# Patient Record
Sex: FEMALE | Race: OTHER | NOT HISPANIC OR LATINO | URBAN - METROPOLITAN AREA
[De-identification: names, ages, dates, MRNs, and addresses within clinical notes are randomized per-mention and may not be internally consistent; named-entity substitution may affect disease eponyms.]

---

## 2023-12-18 ENCOUNTER — EMERGENCY (EMERGENCY)
Facility: HOSPITAL | Age: 28
LOS: 1 days | Discharge: HOME | End: 2023-12-18
Admitting: EMERGENCY MEDICINE
Payer: MEDICAID

## 2023-12-18 VITALS
OXYGEN SATURATION: 97 % | TEMPERATURE: 98 F | HEIGHT: 65 IN | SYSTOLIC BLOOD PRESSURE: 113 MMHG | DIASTOLIC BLOOD PRESSURE: 69 MMHG | WEIGHT: 110.23 LBS | HEART RATE: 74 BPM | RESPIRATION RATE: 18 BRPM

## 2023-12-18 VITALS
SYSTOLIC BLOOD PRESSURE: 111 MMHG | HEART RATE: 68 BPM | TEMPERATURE: 98 F | RESPIRATION RATE: 17 BRPM | OXYGEN SATURATION: 98 % | DIASTOLIC BLOOD PRESSURE: 50 MMHG

## 2023-12-18 PROCEDURE — 99284 EMERGENCY DEPT VISIT MOD MDM: CPT

## 2023-12-18 RX ORDER — IBUPROFEN 200 MG
600 TABLET ORAL ONCE
Refills: 0 | Status: COMPLETED | OUTPATIENT
Start: 2023-12-18 | End: 2023-12-18

## 2023-12-18 RX ORDER — VALACYCLOVIR 500 MG/1
1000 TABLET, FILM COATED ORAL ONCE
Refills: 0 | Status: COMPLETED | OUTPATIENT
Start: 2023-12-18 | End: 2023-12-18

## 2023-12-18 RX ORDER — VALACYCLOVIR 500 MG/1
1 TABLET, FILM COATED ORAL
Qty: 14 | Refills: 0
Start: 2023-12-18 | End: 2023-12-24

## 2023-12-18 RX ORDER — MUPIROCIN 20 MG/G
1 OINTMENT TOPICAL ONCE
Refills: 0 | Status: COMPLETED | OUTPATIENT
Start: 2023-12-18 | End: 2023-12-18

## 2023-12-18 RX ADMIN — Medication 600 MILLIGRAM(S): at 15:26

## 2023-12-18 RX ADMIN — MUPIROCIN 1 APPLICATION(S): 20 OINTMENT TOPICAL at 15:26

## 2023-12-18 RX ADMIN — VALACYCLOVIR 1000 MILLIGRAM(S): 500 TABLET, FILM COATED ORAL at 15:26

## 2023-12-18 NOTE — ED PROVIDER NOTE - CLINICAL SUMMARY MEDICAL DECISION MAKING FREE TEXT BOX
27 y/o F presents complaining of headache and lip sore x1 week. On exam patient has blister to left lower lip. Plan for Motrin and valtrex.

## 2023-12-18 NOTE — ED ADULT NURSE REASSESSMENT NOTE - NS ED NURSE REASSESS COMMENT FT1
Pt dc and all education given and gone over with pt. Pt has no further questions and self ambulating out of ed with all belongings and ppwrk

## 2023-12-18 NOTE — ED ADULT NURSE NOTE - OBJECTIVE STATEMENT
The patient is a 28y Female complaining of headache. Reports rash on bottom left lip. Pt medicated as ordered. All medication education given to patient and pt understands. Denies pain

## 2023-12-18 NOTE — ED PROVIDER NOTE - PATIENT PORTAL LINK FT
You can access the FollowMyHealth Patient Portal offered by [facility] by registering at the following website: [email or web address]. By joining [facility]’s FollowMyHealth portal, you will also be able to view your health information using other applications (apps) compatible with our system. You can access the FollowMyHealth Patient Portal offered by [facility] by registering at the following website: [email or web address]. By joining [facility]’s FollowMyHealth portal, you will also be able to view your health information using other applications (apps) compatible with our system.

## 2023-12-18 NOTE — ED PROVIDER NOTE - OBJECTIVE STATEMENT
27 y/o F with Hx of hypoglycemia not on medications, presents with headache, sore on left lip, and fatigue x1 week. Patient endorses dizziness and malaise. No N/V, no fever, no chills. She has had this on her lip once before but it was more itchy with no pus. No chest pain, not taking any medications. No allergies. No EtOH, no smoking.

## 2023-12-18 NOTE — ED PROVIDER NOTE - PHYSICAL EXAMINATION
VITAL SIGNS: I have reviewed nursing notes and confirm.  CONST: Well appearing; No apparent distress.  ENT: No nasal discharge; airway clear. +Small blister to left lower lip, small amount of yellow crusting.   EYES: Sclera clear. Pupils round and symmetrical bilaterally.  RESP: Breathing comfortably; speaking in full sentences.   MSK: No acute deformities noted to extremities.   NEURO: Alert, oriented. Speech is fluent and appropriate. Moving all extremities appropriately. No gross motor or sensory abnormalities.  SKIN: Skin is normal temperature; no diaphoresis; no pallor.  PSYCH: Cooperative. Appropriate mood, language, and behavior.

## 2023-12-18 NOTE — ED PROVIDER NOTE - NSFOLLOWUPINSTRUCTIONS_ED_ALL_ED_FT
ES POSIBLE QUE ESTA ES DELROY INFECCION CAUSADA POR DELROY BACTERIA OR UN VIRUS.     ESTOY ENVIANDO MEDICINA POR LOS DOS.     DRAIA LAS PASTILAS 2 VECES AL DIAZ POR 7 SOLORZANO.     USA LA CREMA ANTIBIOTICA 3 VECES AL DIAZ POR 5 SOLORZANO.     PUEDE DARIA IBUPROFINA O ACETAMINOFINA SCOTT NECESSARIO.     REGRESA PARA FIEBRE, ESCALAFRIOS, ERUPCION DEL PIEL, MAS DOLOR DE KERRI, DOLOR EN EL LYNETTE, O OTRAS SINTOMAS QUE PREOCUPASE. ES POSIBLE QUE ESTA ES DELROY INFECCION CAUSADA POR DELROY BACTERIA OR UN VIRUS.     ESTOY ENVIANDO MEDICINA POR LOS DOS.     DARIA LAS PASTILAS 2 VECES AL DIAZ POR 7 SOLORZANO.     USA LA CREMA ANTIBIOTICA 3 VECES AL DIAZ POR 5 SOLORZANO.     PUEDE DARIA IBUPROFINA O ACETAMINOFINA SCOTT NECESSARIO.     REGRESA PARA FIEBRE, ESCALAFRIOS, ERUPCION DEL PIEL, MAS DOLOR DE KERRI, DOLOR EN EL LYNETTE, O OTRAS SINTOMAS QUE PREOCUPASE.

## 2023-12-22 DIAGNOSIS — R53.81 OTHER MALAISE: ICD-10-CM

## 2023-12-22 DIAGNOSIS — Z86.39 PERSONAL HISTORY OF OTHER ENDOCRINE, NUTRITIONAL AND METABOLIC DISEASE: ICD-10-CM

## 2023-12-22 DIAGNOSIS — K13.0 DISEASES OF LIPS: ICD-10-CM

## 2023-12-22 DIAGNOSIS — R42 DIZZINESS AND GIDDINESS: ICD-10-CM

## 2023-12-22 DIAGNOSIS — R53.83 OTHER FATIGUE: ICD-10-CM

## 2023-12-22 DIAGNOSIS — R51.9 HEADACHE, UNSPECIFIED: ICD-10-CM

## 2024-02-23 ENCOUNTER — EMERGENCY (EMERGENCY)
Facility: HOSPITAL | Age: 29
LOS: 1 days | Discharge: HOME | End: 2024-02-23
Attending: EMERGENCY MEDICINE | Admitting: EMERGENCY MEDICINE
Payer: MEDICAID

## 2024-02-23 VITALS
RESPIRATION RATE: 16 BRPM | OXYGEN SATURATION: 96 % | HEIGHT: 61.02 IN | WEIGHT: 149.91 LBS | DIASTOLIC BLOOD PRESSURE: 74 MMHG | TEMPERATURE: 98 F | HEART RATE: 70 BPM | SYSTOLIC BLOOD PRESSURE: 114 MMHG

## 2024-02-23 VITALS
TEMPERATURE: 98 F | OXYGEN SATURATION: 98 % | HEART RATE: 67 BPM | DIASTOLIC BLOOD PRESSURE: 75 MMHG | SYSTOLIC BLOOD PRESSURE: 111 MMHG | RESPIRATION RATE: 16 BRPM

## 2024-02-23 PROBLEM — Z86.39 PERSONAL HISTORY OF OTHER ENDOCRINE, NUTRITIONAL AND METABOLIC DISEASE: Chronic | Status: ACTIVE | Noted: 2023-12-18

## 2024-02-23 LAB
ALBUMIN SERPL ELPH-MCNC: 4 G/DL — SIGNIFICANT CHANGE UP (ref 3.4–5)
ALP SERPL-CCNC: 44 U/L — SIGNIFICANT CHANGE UP (ref 40–120)
ALT FLD-CCNC: 12 U/L — SIGNIFICANT CHANGE UP (ref 12–42)
ANION GAP SERPL CALC-SCNC: 9 MMOL/L — SIGNIFICANT CHANGE UP (ref 9–16)
APPEARANCE UR: CLEAR — SIGNIFICANT CHANGE UP
AST SERPL-CCNC: 15 U/L — SIGNIFICANT CHANGE UP (ref 15–37)
BACTERIA # UR AUTO: ABNORMAL /HPF
BASOPHILS # BLD AUTO: 0.04 K/UL — SIGNIFICANT CHANGE UP (ref 0–0.2)
BASOPHILS NFR BLD AUTO: 0.6 % — SIGNIFICANT CHANGE UP (ref 0–2)
BILIRUB SERPL-MCNC: 0.4 MG/DL — SIGNIFICANT CHANGE UP (ref 0.2–1.2)
BILIRUB UR-MCNC: NEGATIVE — SIGNIFICANT CHANGE UP
BUN SERPL-MCNC: 13 MG/DL — SIGNIFICANT CHANGE UP (ref 7–23)
CALCIUM SERPL-MCNC: 9.4 MG/DL — SIGNIFICANT CHANGE UP (ref 8.5–10.5)
CHLORIDE SERPL-SCNC: 103 MMOL/L — SIGNIFICANT CHANGE UP (ref 96–108)
CK SERPL-CCNC: 100 U/L — SIGNIFICANT CHANGE UP (ref 26–192)
CO2 SERPL-SCNC: 28 MMOL/L — SIGNIFICANT CHANGE UP (ref 22–31)
COD CRY URNS QL: SIGNIFICANT CHANGE UP
COLOR SPEC: YELLOW — SIGNIFICANT CHANGE UP
COMMENT - URINE: SIGNIFICANT CHANGE UP
CREAT SERPL-MCNC: 0.79 MG/DL — SIGNIFICANT CHANGE UP (ref 0.5–1.3)
D DIMER BLD IA.RAPID-MCNC: <187 NG/ML DDU — SIGNIFICANT CHANGE UP
DIFF PNL FLD: ABNORMAL
EGFR: 104 ML/MIN/1.73M2 — SIGNIFICANT CHANGE UP
EOSINOPHIL # BLD AUTO: 0.22 K/UL — SIGNIFICANT CHANGE UP (ref 0–0.5)
EOSINOPHIL NFR BLD AUTO: 3.3 % — SIGNIFICANT CHANGE UP (ref 0–6)
EPI CELLS # UR: 1 — SIGNIFICANT CHANGE UP
FLUAV AG NPH QL: SIGNIFICANT CHANGE UP
FLUBV AG NPH QL: SIGNIFICANT CHANGE UP
GLUCOSE SERPL-MCNC: 92 MG/DL — SIGNIFICANT CHANGE UP (ref 70–99)
GLUCOSE UR QL: NEGATIVE MG/DL — SIGNIFICANT CHANGE UP
GRAN CASTS # UR COMP ASSIST: SIGNIFICANT CHANGE UP
HCT VFR BLD CALC: 40.7 % — SIGNIFICANT CHANGE UP (ref 34.5–45)
HGB BLD-MCNC: 13.5 G/DL — SIGNIFICANT CHANGE UP (ref 11.5–15.5)
HYALINE CASTS # UR AUTO: SIGNIFICANT CHANGE UP
IMM GRANULOCYTES NFR BLD AUTO: 0.1 % — SIGNIFICANT CHANGE UP (ref 0–0.9)
KETONES UR-MCNC: NEGATIVE MG/DL — SIGNIFICANT CHANGE UP
LEUKOCYTE ESTERASE UR-ACNC: NEGATIVE — SIGNIFICANT CHANGE UP
LIDOCAIN IGE QN: 23 U/L — SIGNIFICANT CHANGE UP (ref 16–77)
LYMPHOCYTES # BLD AUTO: 2.51 K/UL — SIGNIFICANT CHANGE UP (ref 1–3.3)
LYMPHOCYTES # BLD AUTO: 37.1 % — SIGNIFICANT CHANGE UP (ref 13–44)
MCHC RBC-ENTMCNC: 29.5 PG — SIGNIFICANT CHANGE UP (ref 27–34)
MCHC RBC-ENTMCNC: 33.2 GM/DL — SIGNIFICANT CHANGE UP (ref 32–36)
MCV RBC AUTO: 89.1 FL — SIGNIFICANT CHANGE UP (ref 80–100)
MONOCYTES # BLD AUTO: 0.36 K/UL — SIGNIFICANT CHANGE UP (ref 0–0.9)
MONOCYTES NFR BLD AUTO: 5.3 % — SIGNIFICANT CHANGE UP (ref 2–14)
NEUTROPHILS # BLD AUTO: 3.62 K/UL — SIGNIFICANT CHANGE UP (ref 1.8–7.4)
NEUTROPHILS NFR BLD AUTO: 53.6 % — SIGNIFICANT CHANGE UP (ref 43–77)
NITRITE UR-MCNC: NEGATIVE — SIGNIFICANT CHANGE UP
NRBC # BLD: 0 /100 WBCS — SIGNIFICANT CHANGE UP (ref 0–0)
NT-PROBNP SERPL-SCNC: 20 PG/ML — SIGNIFICANT CHANGE UP
PH UR: 6 — SIGNIFICANT CHANGE UP (ref 5–8)
PLATELET # BLD AUTO: 264 K/UL — SIGNIFICANT CHANGE UP (ref 150–400)
POTASSIUM SERPL-MCNC: 3.9 MMOL/L — SIGNIFICANT CHANGE UP (ref 3.5–5.3)
POTASSIUM SERPL-SCNC: 3.9 MMOL/L — SIGNIFICANT CHANGE UP (ref 3.5–5.3)
PROT SERPL-MCNC: 8.1 G/DL — SIGNIFICANT CHANGE UP (ref 6.4–8.2)
PROT UR-MCNC: NEGATIVE MG/DL — SIGNIFICANT CHANGE UP
RBC # BLD: 4.57 M/UL — SIGNIFICANT CHANGE UP (ref 3.8–5.2)
RBC # FLD: 11.7 % — SIGNIFICANT CHANGE UP (ref 10.3–14.5)
RBC CASTS # UR COMP ASSIST: 2 /HPF — SIGNIFICANT CHANGE UP (ref 0–4)
RSV RNA NPH QL NAA+NON-PROBE: SIGNIFICANT CHANGE UP
SARS-COV-2 RNA SPEC QL NAA+PROBE: SIGNIFICANT CHANGE UP
SODIUM SERPL-SCNC: 140 MMOL/L — SIGNIFICANT CHANGE UP (ref 132–145)
SP GR SPEC: 1.02 — SIGNIFICANT CHANGE UP (ref 1–1.03)
TRI-PHOS CRY UR QL COMP ASSIST: SIGNIFICANT CHANGE UP
TROPONIN I, HIGH SENSITIVITY RESULT: <4 NG/L — SIGNIFICANT CHANGE UP
URATE CRY FLD QL MICRO: SIGNIFICANT CHANGE UP
UROBILINOGEN FLD QL: 0.2 MG/DL — SIGNIFICANT CHANGE UP (ref 0.2–1)
WBC # BLD: 6.76 K/UL — SIGNIFICANT CHANGE UP (ref 3.8–10.5)
WBC # FLD AUTO: 6.76 K/UL — SIGNIFICANT CHANGE UP (ref 3.8–10.5)
WBC UR QL: 0 /HPF — SIGNIFICANT CHANGE UP (ref 0–5)

## 2024-02-23 PROCEDURE — 99284 EMERGENCY DEPT VISIT MOD MDM: CPT

## 2024-02-23 PROCEDURE — 71046 X-RAY EXAM CHEST 2 VIEWS: CPT | Mod: 26

## 2024-02-23 RX ORDER — ACETAMINOPHEN 500 MG
650 TABLET ORAL ONCE
Refills: 0 | Status: COMPLETED | OUTPATIENT
Start: 2024-02-23 | End: 2024-02-23

## 2024-02-23 RX ORDER — KETOROLAC TROMETHAMINE 30 MG/ML
15 SYRINGE (ML) INJECTION ONCE
Refills: 0 | Status: DISCONTINUED | OUTPATIENT
Start: 2024-02-23 | End: 2024-02-23

## 2024-02-23 RX ADMIN — Medication 15 MILLIGRAM(S): at 15:00

## 2024-02-23 RX ADMIN — Medication 650 MILLIGRAM(S): at 11:59

## 2024-02-23 NOTE — ED PROVIDER NOTE - CLINICAL SUMMARY MEDICAL DECISION MAKING FREE TEXT BOX
Pt w no significant pmh presents with several complaints including CP, abd pain, back pain, generalized weakness, body aches for 1 month.  On exam afebrile, VSS, well appearing, abd soft NDNT, no midline spinal TTP.  Ambulatory in ED without difficulty.  No bowel or bladder dysfunction.  No signs concerning for cord compression.  Will check labs, CXR to eval for ACS, PE, rhabdo, other.      Labs unrevealing.  D dimer neg.  Trop neg.  CXR clear.  Pt tolerating PO in ED.  Feels well, wants to go home.  Stable for dc.

## 2024-02-23 NOTE — ED ADULT TRIAGE NOTE - CHIEF COMPLAINT QUOTE
Pt states lower back pain x 1 month. Pt ambulating with steady gait. Pt denies fever. Denies any medical hx.

## 2024-02-23 NOTE — ED PROVIDER NOTE - OBJECTIVE STATEMENT
28-year-old female no significant past medical history presents with generalized weakness for 1 month.  Patient reports aching pain in arms and legs.  Reports abdominal pain and chest pain as well as low back pain.  No shortness of breath.  No cough or hemoptysis.  No leg swelling.  No fevers chills, nausea, vomiting, diarrhea.  No headache.

## 2024-02-23 NOTE — ED PROVIDER NOTE - NSFOLLOWUPINSTRUCTIONS_ED_ALL_ED_FT
Dolor de espalda rylee en los adultos  Acute Back Pain, Adult  El dolor de espalda rylee es repentino y por lo general no dura mucho tiempo. Se debe generalmente a nan lesión de los músculos y tejidos de la espalda. La lesión puede ser el resultado de:  Estiramiento en exceso o desgarro de un músculo, tendón o ligamento. Los ligamentos son tejidos que conectan los huesos. Levantar algo de forma incorrecta puede producir un esguince de espalda.  Desgaste (degeneración) de los discos vertebrales. Los discos vertebrales son tejidos circulares que proporcionan amortiguación entre los huesos de la columna vertebral (vértebras).  Movimientos de giro, bryon al practicar deportes o realizar trabajos de jardinería.  Un golpe en la espalda.  Artritis.  Es posible que le realicen un examen físico, análisis de laboratorio u otros estudios de diagnóstico por imágenes para encontrar la causa del dolor. El dolor de espalda rylee generalmente desaparece con reposo y cuidados en la casa.    Siga estas instrucciones en gannon casa:  Control del dolor, la rigidez y la hinchazón    Use los medicamentos de venta blade y los recetados solamente bryon se lo haya indicado el médico. El tratamiento puede incluir medicamentos para el dolor y la inflamación que se sue por la boca o que se aplican sobre la piel, o relajantes musculares.  El médico puede recomendarle que se aplique hielo jessie las primeras 24 a 48 horas después del comienzo del dolor. Para hacer esto:  Ponga el hielo en nan bolsa plástica.  Coloque nan toalla entre la piel y la bolsa.  Aplique el hielo jessie 20 minutos, 2 o 3 veces por día.  Retire el hielo si la piel se pone de color black brillante. [place] es muy importante. Si no puede sentir dolor, calor o frío, tiene un mayor riesgo de que se dañe la yao.  Si se lo indican, aplique calor en la yao afectada con la frecuencia que le haya indicado el médico. Use la denton de calor que el médico le recomiende, bryon nan compresa de calor húmedo o nan almohadilla térmica.  Coloque nan toalla entre la piel y la denton de calor.  Aplique calor jessie 20 a 30 minutos.  Retire la denton de calor si la piel se pone de color black brillante. [place] es especialmente importante si no puede sentir dolor, calor o frío. Corre un mayor riesgo de sufrir quemaduras.  Actividad    Comparisons of good and bad posture while driving, standing, sitting at a desk, and lifting heavy objects.  No permanezca en la cama. Hacer reposo en la cama por más de 1 a 2 días puede demorar gannon recuperación.  Mantenga nan buena postura al sentarse y pararse. No se incline hacia adelante al sentarse ni se encorve al pararse.  Si trabaja en un escritorio, siéntese cerca de rachael para no tener que inclinarse. Mantenga el mentón hacia abajo. Mantenga el nikki hacia atrás y los codos flexionados en un ángulo de 90 grados (ángulo recto).  Cuando conduzca, siéntese elevado y cerca del volante. Agregue un apoyo para la espalda (lumbar) al asiento del automóvil, si es necesario.  Realice caminatas cortas en superficies jenna tan pronto bryon le sea posible. Trate de caminar un poco más de tiempo cada día.  No se siente, conduzca o permanezca de pie en un mismo lugar jessie más de 30 minutos seguidos. Pararse o sentarse jessie largos períodos de tiempo puede sobrecargar la espalda.  No conduzca ni use maquinaria pesada mientras garth analgésicos recetados.  Use técnicas apropiadas para levantar objetos. Cuando se inclina y levanta un objeto, utilice posiciones que no sobrecarguen tanto la espalda:  Flexione las rodillas.  Mantenga la carga cerca del cuerpo.  No se tuerza.  Mercedes actividad física habitualmente bryon se lo haya indicado el médico. Hacer ejercicios ayuda a que la espalda sane más rápido y ayuda a evitar las lesiones de la espalda al mantener los músculos matilde y flexibles.  Trabaje con un fisioterapeuta para crear un programa de ejercicios seguros, según lo recomiende el médico. Mercedes ejercicios bryon se lo haya indicado el fisioterapeuta.  Estilo de iván    Mantenga un peso saludable. El sobrepeso sobrecarga la espalda y hace que resulte difícil tener nan buena postura.  Evite actividades o situaciones que lo jerald sentirse ansioso o estresado. El estrés y la ansiedad aumentan la tensión muscular y pueden empeorar el dolor de espalda. Aprenda formas de manejar la ansiedad y el estrés, bryon a través del ejercicio.  Instrucciones generales    Duerma sobre un colchón firme en nan posición cómoda. Intente acostarse de costado, con las rodillas ligeramente flexionadas. Si se recuesta sobre la espalda, coloque nan almohada debajo de las rodillas.  Mantenga la mireille y el nikki en línea recta con la columna vertebral (posición neutra) cuando use equipos electrónicos bryon teléfonos inteligentes o tablets. Para hacer esto:  Levante el teléfono inteligente o la tablet para mirarlo en lugar de inclinar la mireille o el nikki para mirar hacia abajo.  Coloque el teléfono inteligente o la tablet al nivel de gannon carie mientras arcelia la pantalla.  Siga el plan de tratamiento bryon se lo haya indicado el médico. [place] puede incluir:  Terapia cognitiva o conductual.  Acupuntura o terapia de masajes.  Yoga o meditación.  Comuníquese con un médico si:  Siente un dolor que no se kenroy con reposo o medicamentos.  Siente mucho dolor que se extiende a las piernas o las nalgas.  El dolor no mejora luego de 2 semanas.  Siente dolor por la noche.  Pierde peso sin proponérselo.  Tiene fiebre o escalofríos.  Siente náuseas o vómitos.  Siente dolor abdominal.  Solicite ayuda de inmediato si:  Tiene nuevos problemas para controlar la vejiga o los intestinos.  Siente debilidad o adormecimiento inusuales en los brazos o en las piernas.  Siente que va a desmayarse.  Estos síntomas pueden representar un problema grave que constituye nan emergencia. No espere a matthew si los síntomas desaparecen. Solicite atención médica de inmediato. Comuníquese con el servicio de emergencias de gannon localidad (911 en los [place]). No conduzca por monserrat propios medios hasta el hospital.    Resumen  El dolor de espalda rylee es repentino y por lo general no dura mucho tiempo.  Use técnicas apropiadas para levantar objetos. Cuando se inclina y levanta un objeto, utilice posiciones que no sobrecarguen tanto la espalda.  [place] los medicamentos de venta blade y los recetados solamente bryon se lo haya indicado el médico, y aplíquese calor o hielo según las indicaciones.  Esta información no tiene bryon fin reemplazar el consejo del médico. Asegúrese de hacerle al médico cualquier pregunta que tenga.

## 2024-02-23 NOTE — ED PROVIDER NOTE - PHYSICAL EXAMINATION
Constitutional: awake and alert, in no acute distress  HEENT: head normocephalic and atraumatic. moist mucous membranes  Eyes: extraocular movements intact, normal conjunctiva  Neck: supple, normal ROM  Cardiovascular: regular rate   Pulmonary: no respiratory distress  Gastrointestinal: abdomen flat and nondistended, nontender  Skin: warm, dry, normal for ethnicity  Musculoskeletal: no edema, no deformity, no midline spinal TTP.  BLE strength 5/5.  Neurological: oriented x4, no focal neurologic deficit.   Psychiatric: calm and cooperative

## 2024-02-23 NOTE — ED ADULT NURSE NOTE - CAS DISCH TRANSFER METHOD

## 2024-02-23 NOTE — ED PROVIDER NOTE - PATIENT PORTAL LINK FT
You can access the FollowMyHealth Patient Portal offered by [facility] by registering at the following website: [email or web address]. By joining [facility]’s FollowMyHealth portal, you will also be able to view your health information using other applications (apps) compatible with our system.

## 2024-02-23 NOTE — ED ADULT NURSE NOTE - OBJECTIVE STATEMENT
pt reporting right lower back pain x1 month worsening throughout the month stating it is not generalized body pain especially in legs. when pain is worse, pt feels short of breath. denies urinary complications, fevers, nausea, vomiting.

## 2024-02-24 LAB
CULTURE RESULTS: SIGNIFICANT CHANGE UP
SPECIMEN SOURCE: SIGNIFICANT CHANGE UP

## 2024-02-26 DIAGNOSIS — Z20.822 CONTACT WITH AND (SUSPECTED) EXPOSURE TO COVID-19: ICD-10-CM

## 2024-02-26 DIAGNOSIS — M79.604 PAIN IN RIGHT LEG: ICD-10-CM

## 2024-02-26 DIAGNOSIS — R10.9 UNSPECIFIED ABDOMINAL PAIN: ICD-10-CM

## 2024-02-26 DIAGNOSIS — M79.605 PAIN IN LEFT LEG: ICD-10-CM

## 2024-02-26 DIAGNOSIS — M79.601 PAIN IN RIGHT ARM: ICD-10-CM

## 2024-02-26 DIAGNOSIS — R53.1 WEAKNESS: ICD-10-CM

## 2024-02-26 DIAGNOSIS — M54.50 LOW BACK PAIN, UNSPECIFIED: ICD-10-CM

## 2024-02-26 DIAGNOSIS — R07.9 CHEST PAIN, UNSPECIFIED: ICD-10-CM

## 2024-02-26 DIAGNOSIS — M79.602 PAIN IN LEFT ARM: ICD-10-CM

## 2024-02-28 NOTE — ED ADULT TRIAGE NOTE - BEFAST SPEECH PHRASE
[Use of Plain Language] : use of plain language [Adequate] : adequate [None] : none [] : I have reviewed management goals with caretaker and provided a copy of care plan Yes

## 2024-06-27 NOTE — ED ADULT NURSE NOTE - HIV OFFER
Unable to reach patient for 90 day post discharge follow up call.   Left voicemail with call back number for patient to call if needed   If no voicemail available call attempts x 2 were made to contact the patient to assist with any questions or concerns patient may have.     Opt out